# Patient Record
Sex: FEMALE
[De-identification: names, ages, dates, MRNs, and addresses within clinical notes are randomized per-mention and may not be internally consistent; named-entity substitution may affect disease eponyms.]

---

## 2019-09-06 ENCOUNTER — HOSPITAL ENCOUNTER (EMERGENCY)
Dept: HOSPITAL 88 - FSED | Age: 24
Discharge: HOME | End: 2019-09-06
Payer: COMMERCIAL

## 2019-09-06 VITALS — SYSTOLIC BLOOD PRESSURE: 137 MMHG | DIASTOLIC BLOOD PRESSURE: 85 MMHG

## 2019-09-06 VITALS — HEIGHT: 67 IN | BODY MASS INDEX: 37.67 KG/M2 | WEIGHT: 240 LBS

## 2019-09-06 DIAGNOSIS — R07.89: Primary | ICD-10-CM

## 2019-09-06 DIAGNOSIS — B34.9: ICD-10-CM

## 2019-09-06 PROCEDURE — 71046 X-RAY EXAM CHEST 2 VIEWS: CPT

## 2019-09-06 PROCEDURE — 81025 URINE PREGNANCY TEST: CPT

## 2019-09-06 PROCEDURE — 85025 COMPLETE CBC W/AUTO DIFF WBC: CPT

## 2019-09-06 PROCEDURE — 85379 FIBRIN DEGRADATION QUANT: CPT

## 2019-09-06 PROCEDURE — 80053 COMPREHEN METABOLIC PANEL: CPT

## 2019-09-06 PROCEDURE — 81003 URINALYSIS AUTO W/O SCOPE: CPT

## 2019-09-06 PROCEDURE — 83518 IMMUNOASSAY DIPSTICK: CPT

## 2019-09-06 PROCEDURE — 87400 INFLUENZA A/B EACH AG IA: CPT

## 2019-09-06 PROCEDURE — 99284 EMERGENCY DEPT VISIT MOD MDM: CPT

## 2019-09-06 PROCEDURE — 82553 CREATINE MB FRACTION: CPT

## 2019-09-06 PROCEDURE — 84484 ASSAY OF TROPONIN QUANT: CPT

## 2019-09-06 NOTE — DIAGNOSTIC IMAGING REPORT
EXAMINATION:  CXR 2 VIEW - HOPD    



INDICATION: Shortness of breath



COMPARISON: None

     

FINDINGS:



LINES/TUBES:None



LUNGS:The lungs are well-inflated. No focal consolidation or pulmonary edema.



PLEURA:No pleural effusion or pneumothorax. Lucency along the lateral left

thoracic wall likely represents a skinfold.



MEDIASTINUM:The cardiomediastinal silhouette appears normal in size and shape.



BONES/SOFT TISSUES:No acute osseous injury.



ABDOMEN:No free air under the diaphragm.





IMPRESSION: 

No focal pneumonia or pulmonary edema.



Signed by: Choco Gordillo MD on 9/6/2019 3:14 PM

## 2019-10-24 ENCOUNTER — HOSPITAL ENCOUNTER (OUTPATIENT)
Dept: HOSPITAL 88 - OR | Age: 24
Discharge: HOME | End: 2019-10-24
Attending: INTERNAL MEDICINE
Payer: COMMERCIAL

## 2019-10-24 VITALS — DIASTOLIC BLOOD PRESSURE: 67 MMHG | SYSTOLIC BLOOD PRESSURE: 105 MMHG

## 2019-10-24 DIAGNOSIS — Z80.0: ICD-10-CM

## 2019-10-24 DIAGNOSIS — K21.0: ICD-10-CM

## 2019-10-24 DIAGNOSIS — F90.9: ICD-10-CM

## 2019-10-24 DIAGNOSIS — K29.70: ICD-10-CM

## 2019-10-24 DIAGNOSIS — Z01.812: ICD-10-CM

## 2019-10-24 DIAGNOSIS — R10.13: Primary | ICD-10-CM

## 2019-10-24 DIAGNOSIS — R13.14: ICD-10-CM

## 2019-10-24 DIAGNOSIS — K44.9: ICD-10-CM

## 2019-10-24 DIAGNOSIS — Z98.84: ICD-10-CM

## 2019-10-24 PROCEDURE — 43239 EGD BIOPSY SINGLE/MULTIPLE: CPT

## 2019-10-24 PROCEDURE — 81025 URINE PREGNANCY TEST: CPT

## 2019-10-24 NOTE — XMS REPORT
Patient Summary Document

                             Created on: 10/24/2019



JAZMINE POWER

External Reference #: [id number]

: 1995

Sex: Female



Demographics







                          Address                   [address] [place]  [address]

 

                          Home Phone                [phone]

 

                          Preferred Language        Unknown

 

                          Marital Status            Unknown

 

                          Anabaptist Affiliation     Unknown

 

                          Race                      Unknown

 

                          Ethnic Group              Unknown





Author







                          Author                    [facility]

 

                          Address                   Unknown

 

                          Phone                     Unavailable







Care Team Providers







                    Care Team Member Name    Role                Phone

 

                    HOLLEY BEASLEY       Unavailable         Unavailable

 

                    LE FLEMING    Unavailable         Unavailable







Problems

This patient has no known problems.



Allergies, Adverse Reactions, Alerts

This patient has no known allergies or adverse reactions.



Medications

This patient has no known medications.



Results







           Test Description    Test Time    Test Comments    Text Results    Atomic Results    Result

 Comments

 

                CXR 2 VIEW - [facility]D    2019 15:10:00                                                         

                                                 [place] [address]      Patient Name: JAZMINE POWER                              
    MR #: [id number]                     : 1995                        
          Age/Sex: 24/F  Acct #: [id number]                              Req 
#: [id number]  Adm Physician:                                                   
  Ordered by: TERRANCE BEASLEY MD                            Report #: [id number]   
    Location: [facility]                                    Room/Bed:                 
   
___________________________________________________________________________________________________
   Procedure: [id number] HOPD/CXR 2 VIEW - [facility]D  Exam Date: 19             
              Exam Time: 1444                                              
REPORT STATUS: Signed    EXAMINATION:  CXR 2 VIEW - [facility]OPD          INDICATION: 
Shortness of breath      COMPARISON: None           FINDINGS:      
LINES/TUBES:None      LUNGS:The lungs are well-inflated. No focal consolidation 
or pulmonary edema.      PLEURA:No pleural effusion or pneumothorax. Lucency 
along the lateral left   thoracic wall likely represents a skinfold.      
MEDIASTINUM:The cardiomediastinal silhouette appears normal in size and shape.  
   BONES/SOFT TISSUES:No acute osseous injury.      ABDOMEN:No free air under 
the diaphragm.         IMPRESSION:    No focal pneumonia or pulmonary edema.    
 Signed by: Leonela Woodruff MD on 2019 3:14 PM        Dictated By: LEONELA WOODRUFF MD
 Electronically Signed By: LEONELA WOODRUFF MD on 19  Transcribed By: 
OMKAR on 19       COPY TO:   TERRANCE BEASLEY MD              

 

                URINALYSIS W/ MICROSCOPIC    2017-04-10 19:58:00                      

 

   

 

                COLOR (BEAKER) (test code=470)    Yellow                           

 

                CLARITY (BEAKER) (test code=469)    Hazy                             

 

                SPECIFIC GRAVITY UA (BEAKER) (test code=468)    1.010           1.001-1.035      

 

                PH UA (BEAKER) (test code=467)    6.0             5.0-8.0          

 

                PROTEIN UA (BEAKER) (test code=464)    Negative        Negative         

 

                GLUCOSE UA (BEAKER) (test code=365)    Negative        Negative         

 

                KETONES UA (BEAKER) (test code=371)    Negative        Negative         

 

                BILIRUBIN UA (BEAKER) (test code=462)    Negative        Negative         

 

                BLOOD UA (BEAKER) (test code=461)    Negative        Negative         

 

                NITRITE UA (BEAKER) (test code=465)    Negative        Negative         

 

                LEUKOCYTE ESTERASE UA (BEAKER) (test code=466)    Negative        Negative         

 

                UROBILINOGEN UA (BEAKER) (test code=463)    0.2 mg/dL       0.2-1.0          

 

                RBC UA-MANUAL (BEAKER) (test code=1659)    <5 /HPF                          

 

                WBC UA-MANUAL (BEAKER) (test code=1661)    <5 /HPF                          

 

                SQUAMOUS EPITHELIAL MANUAL (BEAKER) (test code=1663)    <5 /HPF                          

 

                SOURCE(BEAKER) (test code=2795)                                     





BASIC METABOLIC PANEL2017-04-10 19:56:00* 





                Test Item       Value           Reference Range    Comments

 

                SODIUM (BEAKER) (test code=381)    141 meq/L       135-148          

 

                POTASSIUM (BEAKER) (test code=379)    3.6 meq/L       3.6-5.5          

 

                CHLORIDE (BEAKER) (test code=382)    104 meq/L                  

 

                CO2 (BEAKER) (test code=355)    28 meq/L        24-32            

 

                BLOOD UREA NITROGEN (BEAKER) (test code=354)    10 mg/dL        10-26            

 

                CREATININE (BEAKER) (test code=358)    0.69 mg/dL      0.50-1.20        

 

                GLUCOSE RANDOM (BEAKER) (test code=652)    96 mg/dL                   

 

                CALCIUM (BEAKER) (test code=697)    8.5 mg/dL       8.5-10.5         

 

                EGFR (BEAKER) (test code=1092)    105 mL/min/1.73 sq m                    ESTIMATED GFR IS NOT AS

 ACCURATE AS CREATININE CLEARANCE IN PREDICTING GLOMERULAR FILTRATION RATE. 
ESTIMATED GFR IS NOT APPLICABLE FOR DIALYSIS PATIENTS.





CBC W/PLT COUNT & AUTO DIFFERENTIAL2017-04-10 19:52:00* 





                Test Item       Value           Reference Range    Comments

 

                WHITE BLOOD CELL COUNT (BEAKER) (test code=775)    3.2 10e3/ L     4.0-10.0         

 

                RED BLOOD CELL COUNT (BEAKER) (test code=761)    4.85 10e6/ L    4.00-5.00        

 

                HEMOGLOBIN (BEAKER) (test code=410)    11.5 g/dL       12.0-15.0        

 

                HEMATOCRIT (BEAKER) (test code=411)    35.5 %          36.0-45.0        

 

                MEAN CORPUSCULAR VOLUME (BEAKER) (test code=753)    73.1 fL         82.0-99.0        

 

                MEAN CORPUSCULAR HEMOGLOBIN (BEAKER) (test code=751)    23.7 pg         27.0-33.0        

 

                    MEAN CORPUSCULAR HEMOGLOBIN CONC (BEAKER) (test code=752)    32.4 g/dL           32.0-36.0 

                                         

 

                RED CELL DISTRIBUTION WIDTH (BEAKER) (test code=412)    14.8 %          10.3-14.2        

 

                PLATELET COUNT (BEAKER) (test code=756)    164 10e3/ L     150-430          

 

                MEAN PLATELET VOLUME (BEAKER) (test code=754)    8.8 fL          6.5-10.5         

 

                NEUTROPHILS RELATIVE PERCENT (BEAKER) (test code=429)    49 %                             

 

                LYMPHOCYTES RELATIVE PERCENT (BEAKER) (test code=430)    37 %                             

 

                MONOCYTES RELATIVE PERCENT (BEAKER) (test code=431)    12 %                             

 

                EOSINOPHILS RELATIVE PERCENT (BEAKER) (test code=432)    2 %                              

 

                BASOPHILS RELATIVE PERCENT (BEAKER) (test code=437)    0 %                              

 

                NEUTROPHILS ABSOLUTE COUNT (BEAKER) (test code=670)    1.57 10e3/ L    1.80-8.00        

 

                LYMPHOCYTES ABSOLUTE COUNT (BEAKER) (test code=414)    1.17 10e3/ L    1.48-4.50        

 

                MONOCYTES ABSOLUTE COUNT (BEAKER) (test code=415)    0.38 10e3/ L    0.00-1.30        

 

                EOSINOPHILS ABSOLUTE COUNT (BEAKER) (test code=416)    0.06 10e3/ L    0.00-0.50        

 

                BASOPHILS ABSOLUTE COUNT (BEAKER) (test code=417)    0.01 10e3/ L    0.00-0.20        





PREGNANCY SCREEN, URINE7-04-10 19:51:00* 





                Test Item       Value           Reference Range    Comments

 

                PREGNANCY TEST URINE (BEAKER) (test code=583)    Negative

## 2019-10-24 NOTE — OPERATIVE REPORT
DATE OF PROCEDURE:  10/24/2019

 

SURGEON:  Vasyl Dubose MD

 

PROCEDURE PERFORMED:  Esophagogastroduodenoscopy.

 

PREOPERATIVE DIAGNOSES:  Abdominal pain and dysphagia.

 

POSTOPERATIVE DIAGNOSES:  Status post gastric sleeve operation and gastritis.

 

PREOPERATIVE MEDICATIONS:  Consisted of IV sedation administered under MAC anesthesia.

 

PROCEDURE IN DETAIL:  Using Loosecubes video gastroscope was inserted in the patient's

oropharynx, advanced to hypopharynx, and down to the esophagus.  The mucosa present in

the esophagus was normal and no strictures were seen.  GE junction was at 37 cm.  This

led into a gastric sleeve, which extended down to about 45 cm.  No evidence of any

obstruction was seen.  The sleeve opened up into the body and antrum, which contained

evidence of gastritis, but no ulcerations.  The biopsies were obtained in the antrum.

The motility appeared to be okay.  The pylorus was intact and patent.  The endoscope was

inserted into the duodenal bulb and postbulbar duodenum, which were found to be within

normal limits.  The endoscope was then withdrawn back up into the stomach, back up into

the sleeve, back up into the GE junction, and again no inflammation was seen at the GE

junction.  The endoscope was then 

withdrawn back up into the hypopharynx, oropharynx, and out of the patient's mouth and

the procedure was ended. 

 

 

 

 

______________________________

Vasyl Dubose MD

 

SAF/MODL

D:  10/24/2019 14:29:54

T:  10/24/2019 18:44:40

Job #:  [id number]

## 2021-05-13 ENCOUNTER — HOSPITAL ENCOUNTER (EMERGENCY)
Dept: HOSPITAL 88 - FSED | Age: 26
Discharge: HOME | End: 2021-05-13
Payer: COMMERCIAL

## 2021-05-13 VITALS — SYSTOLIC BLOOD PRESSURE: 110 MMHG | DIASTOLIC BLOOD PRESSURE: 68 MMHG

## 2021-05-13 VITALS — WEIGHT: 233.19 LBS | BODY MASS INDEX: 35.34 KG/M2 | HEIGHT: 68 IN

## 2021-05-13 DIAGNOSIS — F90.9: ICD-10-CM

## 2021-05-13 DIAGNOSIS — K21.9: ICD-10-CM

## 2021-05-13 DIAGNOSIS — Z98.84: ICD-10-CM

## 2021-05-13 DIAGNOSIS — R10.30: Primary | ICD-10-CM

## 2021-05-13 DIAGNOSIS — F17.210: ICD-10-CM

## 2021-05-13 PROCEDURE — 85025 COMPLETE CBC W/AUTO DIFF WBC: CPT

## 2021-05-13 PROCEDURE — 81025 URINE PREGNANCY TEST: CPT

## 2021-05-13 PROCEDURE — 96374 THER/PROPH/DIAG INJ IV PUSH: CPT

## 2021-05-13 PROCEDURE — 99284 EMERGENCY DEPT VISIT MOD MDM: CPT

## 2021-05-13 PROCEDURE — 80048 BASIC METABOLIC PNL TOTAL CA: CPT

## 2021-05-13 PROCEDURE — 80076 HEPATIC FUNCTION PANEL: CPT

## 2021-05-13 PROCEDURE — 74176 CT ABD & PELVIS W/O CONTRAST: CPT

## 2021-05-13 PROCEDURE — 81003 URINALYSIS AUTO W/O SCOPE: CPT

## 2021-05-25 ENCOUNTER — HOSPITAL ENCOUNTER (EMERGENCY)
Dept: HOSPITAL 88 - FSED | Age: 26
Discharge: HOME | End: 2021-05-25
Payer: COMMERCIAL

## 2021-05-25 VITALS — WEIGHT: 234 LBS | BODY MASS INDEX: 35.46 KG/M2 | HEIGHT: 68 IN

## 2021-05-25 VITALS — SYSTOLIC BLOOD PRESSURE: 140 MMHG | DIASTOLIC BLOOD PRESSURE: 69 MMHG

## 2021-05-25 DIAGNOSIS — M26.641: ICD-10-CM

## 2021-05-25 DIAGNOSIS — Z98.84: ICD-10-CM

## 2021-05-25 DIAGNOSIS — K21.9: ICD-10-CM

## 2021-05-25 DIAGNOSIS — R68.84: Primary | ICD-10-CM

## 2021-05-25 DIAGNOSIS — F90.9: ICD-10-CM

## 2021-05-25 PROCEDURE — 99283 EMERGENCY DEPT VISIT LOW MDM: CPT

## 2021-05-25 PROCEDURE — 70486 CT MAXILLOFACIAL W/O DYE: CPT

## 2021-07-03 ENCOUNTER — HOSPITAL ENCOUNTER (EMERGENCY)
Dept: HOSPITAL 88 - FSED | Age: 26
Discharge: HOME | End: 2021-07-03
Payer: COMMERCIAL

## 2021-07-03 VITALS — DIASTOLIC BLOOD PRESSURE: 74 MMHG | SYSTOLIC BLOOD PRESSURE: 128 MMHG

## 2021-07-03 VITALS — BODY MASS INDEX: 38.65 KG/M2 | WEIGHT: 255 LBS | HEIGHT: 68 IN

## 2021-07-03 DIAGNOSIS — K51.00: Primary | ICD-10-CM

## 2021-07-03 PROCEDURE — 96375 TX/PRO/DX INJ NEW DRUG ADDON: CPT

## 2021-07-03 PROCEDURE — 85025 COMPLETE CBC W/AUTO DIFF WBC: CPT

## 2021-07-03 PROCEDURE — 81003 URINALYSIS AUTO W/O SCOPE: CPT

## 2021-07-03 PROCEDURE — 96374 THER/PROPH/DIAG INJ IV PUSH: CPT

## 2021-07-03 PROCEDURE — 74177 CT ABD & PELVIS W/CONTRAST: CPT

## 2021-07-03 PROCEDURE — 96376 TX/PRO/DX INJ SAME DRUG ADON: CPT

## 2021-07-03 PROCEDURE — 99283 EMERGENCY DEPT VISIT LOW MDM: CPT

## 2021-07-03 PROCEDURE — 80076 HEPATIC FUNCTION PANEL: CPT

## 2021-07-03 PROCEDURE — 80048 BASIC METABOLIC PNL TOTAL CA: CPT

## 2021-07-03 PROCEDURE — 81025 URINE PREGNANCY TEST: CPT
